# Patient Record
Sex: FEMALE | Race: WHITE | NOT HISPANIC OR LATINO
[De-identification: names, ages, dates, MRNs, and addresses within clinical notes are randomized per-mention and may not be internally consistent; named-entity substitution may affect disease eponyms.]

---

## 2018-01-10 ENCOUNTER — RESULT REVIEW (OUTPATIENT)
Age: 43
End: 2018-01-10

## 2018-08-02 ENCOUNTER — EMERGENCY (EMERGENCY)
Facility: HOSPITAL | Age: 43
LOS: 1 days | End: 2018-08-02
Attending: EMERGENCY MEDICINE
Payer: COMMERCIAL

## 2018-08-02 VITALS
HEART RATE: 96 BPM | TEMPERATURE: 99 F | RESPIRATION RATE: 14 BRPM | DIASTOLIC BLOOD PRESSURE: 76 MMHG | SYSTOLIC BLOOD PRESSURE: 106 MMHG | OXYGEN SATURATION: 98 %

## 2018-08-02 DIAGNOSIS — K11.21 ACUTE SIALOADENITIS: ICD-10-CM

## 2018-08-02 DIAGNOSIS — H60.90 UNSPECIFIED OTITIS EXTERNA, UNSPECIFIED EAR: ICD-10-CM

## 2018-08-02 LAB
ALBUMIN SERPL ELPH-MCNC: 3.7 G/DL — SIGNIFICANT CHANGE UP (ref 3.3–5)
ALP SERPL-CCNC: 91 U/L — SIGNIFICANT CHANGE UP (ref 40–120)
ALT FLD-CCNC: 20 U/L — SIGNIFICANT CHANGE UP (ref 10–45)
ANION GAP SERPL CALC-SCNC: 10 MMOL/L — SIGNIFICANT CHANGE UP (ref 5–17)
AST SERPL-CCNC: 12 U/L — SIGNIFICANT CHANGE UP (ref 10–40)
BASE EXCESS BLDV CALC-SCNC: 0.4 MMOL/L — SIGNIFICANT CHANGE UP (ref -2–2)
BASOPHILS # BLD AUTO: 0.1 K/UL — SIGNIFICANT CHANGE UP (ref 0–0.2)
BASOPHILS NFR BLD AUTO: 0.6 % — SIGNIFICANT CHANGE UP (ref 0–2)
BILIRUB SERPL-MCNC: 0.1 MG/DL — LOW (ref 0.2–1.2)
BUN SERPL-MCNC: 7 MG/DL — SIGNIFICANT CHANGE UP (ref 7–23)
CA-I SERPL-SCNC: 1.18 MMOL/L — SIGNIFICANT CHANGE UP (ref 1.12–1.3)
CALCIUM SERPL-MCNC: 9 MG/DL — SIGNIFICANT CHANGE UP (ref 8.4–10.5)
CHLORIDE BLDV-SCNC: 109 MMOL/L — HIGH (ref 96–108)
CHLORIDE SERPL-SCNC: 104 MMOL/L — SIGNIFICANT CHANGE UP (ref 96–108)
CO2 BLDV-SCNC: 30 MMOL/L — SIGNIFICANT CHANGE UP (ref 22–30)
CO2 SERPL-SCNC: 27 MMOL/L — SIGNIFICANT CHANGE UP (ref 22–31)
CREAT SERPL-MCNC: 0.87 MG/DL — SIGNIFICANT CHANGE UP (ref 0.5–1.3)
EOSINOPHIL # BLD AUTO: 0.3 K/UL — SIGNIFICANT CHANGE UP (ref 0–0.5)
EOSINOPHIL NFR BLD AUTO: 2.2 % — SIGNIFICANT CHANGE UP (ref 0–6)
GAS PNL BLDV: 137 MMOL/L — SIGNIFICANT CHANGE UP (ref 136–145)
GAS PNL BLDV: SIGNIFICANT CHANGE UP
GLUCOSE BLDV-MCNC: 70 MG/DL — SIGNIFICANT CHANGE UP (ref 70–99)
GLUCOSE SERPL-MCNC: 77 MG/DL — SIGNIFICANT CHANGE UP (ref 70–99)
HCG SERPL-ACNC: <2 MIU/ML — SIGNIFICANT CHANGE UP
HCO3 BLDV-SCNC: 28 MMOL/L — SIGNIFICANT CHANGE UP (ref 21–29)
HCT VFR BLD CALC: 37.9 % — SIGNIFICANT CHANGE UP (ref 34.5–45)
HCT VFR BLDA CALC: 39 % — SIGNIFICANT CHANGE UP (ref 39–50)
HGB BLD CALC-MCNC: 12.6 G/DL — SIGNIFICANT CHANGE UP (ref 11.5–15.5)
HGB BLD-MCNC: 12.7 G/DL — SIGNIFICANT CHANGE UP (ref 11.5–15.5)
LACTATE BLDV-MCNC: 1.5 MMOL/L — SIGNIFICANT CHANGE UP (ref 0.7–2)
LYMPHOCYTES # BLD AUTO: 29.2 % — SIGNIFICANT CHANGE UP (ref 13–44)
LYMPHOCYTES # BLD AUTO: 3.7 K/UL — HIGH (ref 1–3.3)
MCHC RBC-ENTMCNC: 29.9 PG — SIGNIFICANT CHANGE UP (ref 27–34)
MCHC RBC-ENTMCNC: 33.5 GM/DL — SIGNIFICANT CHANGE UP (ref 32–36)
MCV RBC AUTO: 89.4 FL — SIGNIFICANT CHANGE UP (ref 80–100)
MONOCYTES # BLD AUTO: 0.8 K/UL — SIGNIFICANT CHANGE UP (ref 0–0.9)
MONOCYTES NFR BLD AUTO: 6.4 % — SIGNIFICANT CHANGE UP (ref 2–14)
NEUTROPHILS # BLD AUTO: 7.9 K/UL — HIGH (ref 1.8–7.4)
NEUTROPHILS NFR BLD AUTO: 61.7 % — SIGNIFICANT CHANGE UP (ref 43–77)
PCO2 BLDV: 60 MMHG — HIGH (ref 35–50)
PH BLDV: 7.28 — LOW (ref 7.35–7.45)
PLATELET # BLD AUTO: 324 K/UL — SIGNIFICANT CHANGE UP (ref 150–400)
PO2 BLDV: 20 MMHG — LOW (ref 25–45)
POTASSIUM BLDV-SCNC: 4 MMOL/L — SIGNIFICANT CHANGE UP (ref 3.5–5.3)
POTASSIUM SERPL-MCNC: 4.3 MMOL/L — SIGNIFICANT CHANGE UP (ref 3.5–5.3)
POTASSIUM SERPL-SCNC: 4.3 MMOL/L — SIGNIFICANT CHANGE UP (ref 3.5–5.3)
PROT SERPL-MCNC: 7 G/DL — SIGNIFICANT CHANGE UP (ref 6–8.3)
RBC # BLD: 4.25 M/UL — SIGNIFICANT CHANGE UP (ref 3.8–5.2)
RBC # FLD: 13.5 % — SIGNIFICANT CHANGE UP (ref 10.3–14.5)
SAO2 % BLDV: 35 % — LOW (ref 67–88)
SODIUM SERPL-SCNC: 141 MMOL/L — SIGNIFICANT CHANGE UP (ref 135–145)
WBC # BLD: 12.8 K/UL — HIGH (ref 3.8–10.5)
WBC # FLD AUTO: 12.8 K/UL — HIGH (ref 3.8–10.5)

## 2018-08-02 PROCEDURE — 70491 CT SOFT TISSUE NECK W/DYE: CPT | Mod: 26

## 2018-08-02 PROCEDURE — 99220: CPT

## 2018-08-02 RX ORDER — HYDROMORPHONE HYDROCHLORIDE 2 MG/ML
0.5 INJECTION INTRAMUSCULAR; INTRAVENOUS; SUBCUTANEOUS ONCE
Qty: 0 | Refills: 0 | Status: DISCONTINUED | OUTPATIENT
Start: 2018-08-02 | End: 2018-08-02

## 2018-08-02 RX ORDER — SERTRALINE 25 MG/1
50 TABLET, FILM COATED ORAL AT BEDTIME
Qty: 0 | Refills: 0 | Status: DISCONTINUED | OUTPATIENT
Start: 2018-08-02 | End: 2018-08-06

## 2018-08-02 RX ORDER — SODIUM CHLORIDE 9 MG/ML
3 INJECTION INTRAMUSCULAR; INTRAVENOUS; SUBCUTANEOUS EVERY 8 HOURS
Qty: 0 | Refills: 0 | Status: DISCONTINUED | OUTPATIENT
Start: 2018-08-02 | End: 2018-08-06

## 2018-08-02 RX ORDER — CIPROFLOXACIN LACTATE 400MG/40ML
400 VIAL (ML) INTRAVENOUS EVERY 12 HOURS
Qty: 0 | Refills: 0 | Status: DISCONTINUED | OUTPATIENT
Start: 2018-08-02 | End: 2018-08-06

## 2018-08-02 RX ORDER — DULOXETINE HYDROCHLORIDE 30 MG/1
60 CAPSULE, DELAYED RELEASE ORAL AT BEDTIME
Qty: 0 | Refills: 0 | Status: DISCONTINUED | OUTPATIENT
Start: 2018-08-02 | End: 2018-08-06

## 2018-08-02 RX ORDER — ACETAMINOPHEN 500 MG
650 TABLET ORAL ONCE
Qty: 0 | Refills: 0 | Status: DISCONTINUED | OUTPATIENT
Start: 2018-08-02 | End: 2018-08-02

## 2018-08-02 RX ORDER — ACETAMINOPHEN 500 MG
1000 TABLET ORAL ONCE
Qty: 0 | Refills: 0 | Status: COMPLETED | OUTPATIENT
Start: 2018-08-02 | End: 2018-08-02

## 2018-08-02 RX ORDER — IBUPROFEN 200 MG
600 TABLET ORAL ONCE
Qty: 0 | Refills: 0 | Status: DISCONTINUED | OUTPATIENT
Start: 2018-08-02 | End: 2018-08-02

## 2018-08-02 RX ORDER — CIPROFLOXACIN AND DEXAMETHASONE 3; 1 MG/ML; MG/ML
2 SUSPENSION/ DROPS AURICULAR (OTIC) ONCE
Qty: 0 | Refills: 0 | Status: COMPLETED | OUTPATIENT
Start: 2018-08-02 | End: 2018-08-02

## 2018-08-02 RX ORDER — DEXAMETHASONE 0.5 MG/5ML
10 ELIXIR ORAL ONCE
Qty: 0 | Refills: 0 | Status: COMPLETED | OUTPATIENT
Start: 2018-08-02 | End: 2018-08-02

## 2018-08-02 RX ORDER — SODIUM CHLORIDE 9 MG/ML
1000 INJECTION INTRAMUSCULAR; INTRAVENOUS; SUBCUTANEOUS ONCE
Qty: 0 | Refills: 0 | Status: COMPLETED | OUTPATIENT
Start: 2018-08-02 | End: 2018-08-02

## 2018-08-02 RX ORDER — KETOROLAC TROMETHAMINE 30 MG/ML
30 SYRINGE (ML) INJECTION ONCE
Qty: 0 | Refills: 0 | Status: DISCONTINUED | OUTPATIENT
Start: 2018-08-02 | End: 2018-08-02

## 2018-08-02 RX ORDER — CIPROFLOXACIN LACTATE 400MG/40ML
400 VIAL (ML) INTRAVENOUS ONCE
Qty: 0 | Refills: 0 | Status: COMPLETED | OUTPATIENT
Start: 2018-08-02 | End: 2018-08-02

## 2018-08-02 RX ORDER — CLONAZEPAM 1 MG
1 TABLET ORAL AT BEDTIME
Qty: 0 | Refills: 0 | Status: COMPLETED | OUTPATIENT
Start: 2018-08-02 | End: 2018-08-09

## 2018-08-02 RX ORDER — FAMOTIDINE 10 MG/ML
20 INJECTION INTRAVENOUS AT BEDTIME
Qty: 0 | Refills: 0 | Status: DISCONTINUED | OUTPATIENT
Start: 2018-08-02 | End: 2018-08-06

## 2018-08-02 RX ORDER — DULOXETINE HYDROCHLORIDE 30 MG/1
40 CAPSULE, DELAYED RELEASE ORAL DAILY
Qty: 0 | Refills: 0 | Status: DISCONTINUED | OUTPATIENT
Start: 2018-08-02 | End: 2018-08-06

## 2018-08-02 RX ADMIN — HYDROMORPHONE HYDROCHLORIDE 0.5 MILLIGRAM(S): 2 INJECTION INTRAMUSCULAR; INTRAVENOUS; SUBCUTANEOUS at 22:55

## 2018-08-02 RX ADMIN — FAMOTIDINE 20 MILLIGRAM(S): 10 INJECTION INTRAVENOUS at 23:51

## 2018-08-02 RX ADMIN — HYDROMORPHONE HYDROCHLORIDE 0.5 MILLIGRAM(S): 2 INJECTION INTRAMUSCULAR; INTRAVENOUS; SUBCUTANEOUS at 22:02

## 2018-08-02 RX ADMIN — Medication 102 MILLIGRAM(S): at 22:55

## 2018-08-02 RX ADMIN — Medication 1000 MILLIGRAM(S): at 19:05

## 2018-08-02 RX ADMIN — SERTRALINE 50 MILLIGRAM(S): 25 TABLET, FILM COATED ORAL at 23:51

## 2018-08-02 RX ADMIN — Medication 30 MILLIGRAM(S): at 23:51

## 2018-08-02 RX ADMIN — SODIUM CHLORIDE 1000 MILLILITER(S): 9 INJECTION INTRAMUSCULAR; INTRAVENOUS; SUBCUTANEOUS at 19:55

## 2018-08-02 RX ADMIN — HYDROMORPHONE HYDROCHLORIDE 0.5 MILLIGRAM(S): 2 INJECTION INTRAMUSCULAR; INTRAVENOUS; SUBCUTANEOUS at 20:42

## 2018-08-02 RX ADMIN — SODIUM CHLORIDE 1000 MILLILITER(S): 9 INJECTION INTRAMUSCULAR; INTRAVENOUS; SUBCUTANEOUS at 18:47

## 2018-08-02 RX ADMIN — CIPROFLOXACIN AND DEXAMETHASONE 2 DROP(S): 3; 1 SUSPENSION/ DROPS AURICULAR (OTIC) at 22:01

## 2018-08-02 RX ADMIN — Medication 400 MILLIGRAM(S): at 18:47

## 2018-08-02 RX ADMIN — Medication 200 MILLIGRAM(S): at 22:24

## 2018-08-02 NOTE — CONSULT NOTE ADULT - PROBLEM SELECTOR RECOMMENDATION 9
Pt discussed in detail with Dr. Johnson, plan for HOB elevation  - DM control  - Antibiotics with gram coverage (augmentin 875mg bid x 10 days, or keflex 500mg po bid)  - Massage and warm compresses and sialogogues (anything sour- lemon chris) q 4  - hydration Pt discussed in detail with Dr. Johnson, plan for HOB elevation  - Antibiotics with gram coverage  - Massage and warm compresses and sialogogues (anything sour- lemon juice) q 4  - hydration

## 2018-08-02 NOTE — ED PROVIDER NOTE - ATTENDING CONTRIBUTION TO CARE
Attending MD Mosher:  I personally have seen and examined this patient.  Resident note reviewed and agree on plan of care and except where noted.  See MDM for details.

## 2018-08-02 NOTE — ED ADULT NURSE NOTE - OBJECTIVE STATEMENT
1705 42 yr old WF c/o severe right facial pain x 2 days associated with difficulty eating and drinking liquids. Was eval by PMD 2 days ago. Started on amoxil at that time.  Seen at another ER yesterday. Feels much worse today. Denies fever or chills. A&Ox4. Looks uncomfortable. No stridor, drooling or SOB. also c/o right ear pain. color pink. skin W&D. Lungs clear

## 2018-08-02 NOTE — CONSULT NOTE ADULT - ASSESSMENT
42y with right OE possible OM and parotitis 42y with right OE possible OM and parotitis. Wick placed in right ear, once swelling decreases wick will fall out on its own. or may need removal at follow up apt in one week.

## 2018-08-02 NOTE — ED ADULT NURSE NOTE - NSIMPLEMENTINTERV_GEN_ALL_ED
Implemented All Universal Safety Interventions:  [place] to call system. Call bell, personal items and telephone within reach. Instruct patient to call for assistance. Room bathroom lighting operational. Non-slip footwear when patient is off stretcher. Physically safe environment: no spills, clutter or unnecessary equipment. Stretcher in lowest position, wheels locked, appropriate side rails in place.

## 2018-08-02 NOTE — ED PROVIDER NOTE - MEDICAL DECISION MAKING DETAILS
43yo female hx of hashimoto's thyroiditis on synthroid, DM, psoriasis presenting with increased right cheek and neck pain and swelling along with chills, difficulty and pain with swallowing. Swelling and tender right cheek. Labs, vbg, CT neck soft tissue. 41yo female hx of hashimoto's thyroiditis on synthroid, DM, psoriasis presenting with increased right cheek and neck pain and swelling along with chills, difficulty and pain with swallowing. Swelling and tender right cheek. Labs, vbg, CT neck soft tissue.    Attending MD Mosher: 41 yo female DM, hashimotos, recent ear infection on amoxicillin.  Now with right sided facial and neck swelling, painful swallowing, right ear hearing decreased, taking percocet but ran out.  No fever.  On exam no stridor, right sided neck and lower cheek swelling, no dental caries or infection.  Plan:  CT soft tissue neck with iv contrast to rule out abscess vs lymph node.  Will check vaccination states for mumps risk, consider parotid gland stone. 41yo female hx of hashimoto's thyroiditis on synthroid, DM, psoriasis presenting with increased right cheek and neck pain and swelling along with chills, difficulty and pain with swallowing. Swelling and tender right cheek. Labs, vbg, CT neck soft tissue.    Attending MD Mosher: 41 yo female DM, hashimotos, recent ear infection on amoxicillin.  Now with right sided facial and neck swelling, painful swallowing, right ear hearing decreased, taking percocet but ran out.  No fever.  On exam no stridor, right sided neck and lower cheek swelling, no dental caries or infection, pain with movement of right pinna, EOC swelling and erythema.  Plan:  CT soft tissue neck with iv contrast to rule out abscess vs lymph node.  Will check vaccination states for mumps risk, consider parotid gland stone.  Seen by ENT in ED and recommends IV antibiotics and observation in the CDU.

## 2018-08-02 NOTE — ED PROVIDER NOTE - PHYSICAL EXAMINATION
Gen: No acute distress, alert, cooperative  Head: Normocephalic, Atraumatic  HEENT: PERRL, oral mucosa moist with no intraoral swelling or erythema. right sided neck swelling and diffuse tenderness from below mandible retirement down neck. Possibly enlarged lymph nodes in that area, but more diffuse swelling.   Lung: CTAB, no respiratory distress, no crackles or wheezes  CV: rrr, no murmur  Abd: soft, NTND, no rebound or guarding  MSK: No LE edema  Neuro: No focal neurologic deficits  Skin: Warm and dry, no evidence of rash   Psych: normal affect, follows commands

## 2018-08-02 NOTE — ED PROVIDER NOTE - PMH
Hashimoto's disease    Hypothyroid    Moderate episode of recurrent major depressive disorder    Pre-existing diabetes mellitus during pregnancy, unspecified trimester

## 2018-08-02 NOTE — ED PROVIDER NOTE - CARE PLAN
Principal Discharge DX:	Other infective acute otitis externa of right ear  Secondary Diagnosis:	Other acute nonsuppurative otitis media of right ear  Secondary Diagnosis:	Parotiditis

## 2018-08-02 NOTE — CONSULT NOTE ADULT - PROBLEM SELECTOR RECOMMENDATION 2
- Floxin drops 5 frops to affected ear BID   - ciprodex 3 drops bid - IV cipro   - IV decadron x 1 dose   - ciprodex 3 drops bid in right ear   - CDU for overnight for pain control   - will re-eval pt in am   - Pt is to follow up at [facility] ENT clinic in 1 weeks (important to confirm Wick fell out or required removal. Call [phone] to make appointment.

## 2018-08-02 NOTE — ED ADULT NURSE NOTE - CHIEF COMPLAINT QUOTE
pt was seen at [facility] o Tuesday dx lymph nodes swollen gave her 6 Percocet and Motrin and now has no lesia meds and states she could not get appointment with any ENT  right side facila swelling not getting better also has steroids

## 2018-08-02 NOTE — ED PROVIDER NOTE - OBJECTIVE STATEMENT
41yo female hx of hashimoto's thyroiditis on synthroid, DM, psoriasis presenting with increased right cheek and neck pain and swelling along with chills, difficulty and pain with swallowing. Was seen on Tuesday and diagnosed with ear infection. Started on amoxicillin, symptoms only becoming worse. Hasn't been able to see ENT. Hasn't taken anything for pain today, but was taking percs and ibuprofen. 41yo female hx of hashimoto's thyroiditis on synthroid, psoriasis presenting with increased right cheek and neck pain and swelling along with chills, difficulty and pain with swallowing. Was seen on Tuesday and diagnosed with ear infection. Started on amoxicillin, symptoms only becoming worse. Hasn't been able to see ENT. Hasn't taken anything for pain today, but was taking percs and ibuprofen.

## 2018-08-02 NOTE — ED ADULT NURSE REASSESSMENT NOTE - NS ED NURSE REASSESS COMMENT FT1
Pt received from NANCY Petersen. Pt oriented to CDU & plan of care was discussed. Pt complaining of above 10/10 pain to R side of face. Pt endorses ear pain radiating down R face and jaw. Pt endorses pain with swallowing. Medicated as per MAR. Safety & comfort measures maintained. Call bell in reach. Will continue to monitor.

## 2018-08-02 NOTE — CONSULT NOTE ADULT - SUBJECTIVE AND OBJECTIVE BOX
CC: right OE and parotitis    HPI:  Patient is a 42y old  Female who we are asked to evaluate the patient for right OE and parotitis. Pt with hx of hashimoto's thyroiditis on synthroid, psoriasis presenting with increased right cheek, ear and neck pain and swelling along with chills, pain with swallowing. Was seen on Tuesday and diagnosed with ear infection. Started on amoxicillin, symptoms only becoming worse. Hasn't taken anything for pain today, but was taking percs and ibuprofen. Pt has not seen ENT out yet. Pt denies any n/v, tinnitus, dizziness, congestion, recent URI, otorrhea, hearing loss, hx of sx or trauma or recent travel, drooling, trismus, uvula deviation, change in saliva color.        PAST MEDICAL & SURGICAL HISTORY:  Moderate episode of recurrent major depressive disorder  Pre-existing diabetes mellitus during pregnancy, unspecified trimester  Hashimoto's disease  Hypothyroid  No significant past surgical history    Allergies    levothyroxine (Headache)  morphine (Headache)    Intolerances      MEDICATIONS  (STANDING):  ciprofloxacin/dexamethasone Suspension Otic 2 Drop(s) Right Ear Once  HYDROmorphone  Injectable 0.5 milliGRAM(s) IV Push Once    MEDICATIONS  (PRN):      Social History: no tobacco, no etoh     Family history: Pt denies any sign FHx    ROS:   ENT: all negative except as noted in HPI   CV: denies palpitations  Pulm: denies SOB, cough, hemoptysis  GI: denies change in apetite, indigestion, n/v  : denies pertinent urinary symptoms, urgency  Neuro: denies numbness/tingling, loss of sensation  Psych: denies anxiety  MS: denies muscle weakness, instability  Heme: denies easy bruising or bleeding  Endo: denies heat/cold intolerance, excessive sweating  Vascular: denies LE edema    Vital Signs Last 24 Hrs  T(C): 36.7 (02 Aug 2018 17:45), Max: 37.1 (02 Aug 2018 16:30)  T(F): 98.1 (02 Aug 2018 17:45), Max: 98.7 (02 Aug 2018 16:30)  HR: 84 (02 Aug 2018 17:45) (84 - 96)  BP: 114/75 (02 Aug 2018 17:45) (106/76 - 114/75)  BP(mean): --  RR: 20 (02 Aug 2018 17:45) (14 - 20)  SpO2: 98% (02 Aug 2018 16:30) (98% - 98%)                          12.7   12.8  )-----------( 324      ( 02 Aug 2018 18:49 )             37.9    08-02    141  |  104  |  7   ----------------------------<  77  4.3   |  27  |  0.87    Ca    9.0      02 Aug 2018 18:49    TPro  7.0  /  Alb  3.7  /  TBili  0.1<L>  /  DBili  x   /  AST  12  /  ALT  20  /  AlkPhos  91  08-02       PHYSICAL EXAM:  Gen: NAD  Skin: No rashes, bruises, or lesions  Head: Normocephalic, Atraumatic  Face: Left Parotid gland soft without mass. Right with minimal induration and edema, no fluctuance, warmths, stone or mass on palpation  Eyes: no scleral injection  Ears: Right - ear canal clear, TM intact without effusion or erythema. No evidence of any fluid drainage. No mastoid tenderness, erythema, or ear bulging            Left - ear canal clear, TM intact without effusion or erythema. No evidence of any fluid drainage. No mastoid tenderness, erythema, or ear bulging  Nose: Nares bilaterally patent, no discharge  Mouth: No Stridor / Drooling / Trismus.  Mucosa moist, tongue/uvula midline, oropharynx clear, No pus from Stensens duct for parotitis and Whartons duct for submandibular gland.  Neck: edema and erythema on right side, supple, no lymphadenopathy, trachea midline, no masses  Lymphatic: No lymphadenopathy  Resp: breathing easily, no stridor  CV: no peripheral edema/cyanosis  GI: nondistended   Peripheral vascular: no JVD or edema  Neuro: facial nerve intact, no facial droop        Diagnostic Nasal Endoscopy: (Scope #2 used)    Fiberoptic Indirect laryngoscopy:  (Scope #2 used)        IMAGING/ADDITIONAL STUDIES:  < from: CT Neck Soft Tissue w/ IV Cont (08.02.18 @ 19:40) >  IMPRESSION:  Right otitis externa and likely otitis media with mild adjacent right   parotitis and neck swelling.    < end of copied text > CC: right OE and parotitis    HPI:  Patient is a 42y old  Female who we are asked to evaluate for right OE and parotitis. Pt with hx of hashimoto's thyroiditis on synthroid, psoriasis presenting with increased right cheek, ear and neck pain and swelling along with chills, pain with swallowing. Was seen on Tuesday and diagnosed with ear infection. Started on amoxicillin, symptoms only becoming worse. Hasn't taken anything for pain today, but was taking percs and ibuprofen. Pt has not seen ENT out yet. Pt denies any n/v, tinnitus, dizziness, congestion, recent URI, otorrhea, hearing loss, hx of sx or trauma or recent travel, drooling, trismus, uvula deviation, change in saliva color.        PAST MEDICAL & SURGICAL HISTORY:  Moderate episode of recurrent major depressive disorder  Pre-existing diabetes mellitus during pregnancy, unspecified trimester  Hashimoto's disease  Hypothyroid  No significant past surgical history    Allergies    levothyroxine (Headache)  morphine (Headache)    Intolerances      MEDICATIONS  (STANDING):  ciprofloxacin/dexamethasone Suspension Otic 2 Drop(s) Right Ear Once  HYDROmorphone  Injectable 0.5 milliGRAM(s) IV Push Once    MEDICATIONS  (PRN):      Social History: no tobacco, no etoh     Family history: Pt denies any sign FHx    ROS:   ENT: all negative except as noted in HPI   CV: denies palpitations  Pulm: denies SOB, cough, hemoptysis  GI: denies change in apetite, indigestion, n/v  : denies pertinent urinary symptoms, urgency  Neuro: denies numbness/tingling, loss of sensation  Psych: denies anxiety  MS: denies muscle weakness, instability  Heme: denies easy bruising or bleeding  Endo: denies heat/cold intolerance, excessive sweating  Vascular: denies LE edema    Vital Signs Last 24 Hrs  T(C): 36.7 (02 Aug 2018 17:45), Max: 37.1 (02 Aug 2018 16:30)  T(F): 98.1 (02 Aug 2018 17:45), Max: 98.7 (02 Aug 2018 16:30)  HR: 84 (02 Aug 2018 17:45) (84 - 96)  BP: 114/75 (02 Aug 2018 17:45) (106/76 - 114/75)  BP(mean): --  RR: 20 (02 Aug 2018 17:45) (14 - 20)  SpO2: 98% (02 Aug 2018 16:30) (98% - 98%)                          12.7   12.8  )-----------( 324      ( 02 Aug 2018 18:49 )             37.9    08-02    141  |  104  |  7   ----------------------------<  77  4.3   |  27  |  0.87    Ca    9.0      02 Aug 2018 18:49    TPro  7.0  /  Alb  3.7  /  TBili  0.1<L>  /  DBili  x   /  AST  12  /  ALT  20  /  AlkPhos  91  08-02       PHYSICAL EXAM:  Gen: NAD  Skin: No rashes, bruises, or lesions  Head: Normocephalic, Atraumatic  Face: Left Parotid gland soft without mass. Right with minimal induration and edema, no fluctuance, warmths, stone on palpation  Eyes: no scleral injection  Ears: Right - mild edema of auricle, TTP on pina, tragus, ear canal swollen, unable to visualize TM, +clear fluid noted, mild erythema. wick placed. No mastoid tenderness, erythema,             Left - ear canal clear, TM intact without effusion or erythema. No evidence of any fluid drainage. No mastoid tenderness, erythema, or ear bulging  Nose: Nares bilaterally patent, no discharge  Mouth: No Stridor / Drooling / Trismus.  Mucosa moist, tongue/uvula midline, oropharynx clear, No pus from Stensens duct for parotitis and Whartons duct for submandibular gland.  Neck: edema on right side, supple, no lymphadenopathy, trachea midline, no masses  Lymphatic: No lymphadenopathy  Resp: breathing easily, no stridor  CV: no peripheral edema/cyanosis  GI: nondistended   Peripheral vascular: no JVD or edema  Neuro: facial nerve intact, no facial droop            Fiberoptic Indirect laryngoscopy:  (Scope #2 used)        IMAGING/ADDITIONAL STUDIES:  < from: CT Neck Soft Tissue w/ IV Cont (08.02.18 @ 19:40) >  IMPRESSION:  Right otitis externa and likely otitis media with mild adjacent right   parotitis and neck swelling.    < end of copied text >

## 2018-08-02 NOTE — ED ADULT TRIAGE NOTE - CHIEF COMPLAINT QUOTE
pt was seen at [facility] o Tuesday dx lymph nodes swollen gave her 6 Percocet and Motrin and now has no lesia meds and states she could not get appointment with any ENT  right side facila swelling not getting better also has steroids

## 2018-08-03 VITALS
SYSTOLIC BLOOD PRESSURE: 120 MMHG | DIASTOLIC BLOOD PRESSURE: 84 MMHG | HEART RATE: 80 BPM | TEMPERATURE: 98 F | RESPIRATION RATE: 16 BRPM | OXYGEN SATURATION: 96 %

## 2018-08-03 DIAGNOSIS — K11.20 SIALOADENITIS, UNSPECIFIED: ICD-10-CM

## 2018-08-03 PROCEDURE — 80053 COMPREHEN METABOLIC PANEL: CPT

## 2018-08-03 PROCEDURE — 96375 TX/PRO/DX INJ NEW DRUG ADDON: CPT

## 2018-08-03 PROCEDURE — 96376 TX/PRO/DX INJ SAME DRUG ADON: CPT | Mod: XU

## 2018-08-03 PROCEDURE — 82803 BLOOD GASES ANY COMBINATION: CPT

## 2018-08-03 PROCEDURE — 99284 EMERGENCY DEPT VISIT MOD MDM: CPT | Mod: 25

## 2018-08-03 PROCEDURE — 96361 HYDRATE IV INFUSION ADD-ON: CPT

## 2018-08-03 PROCEDURE — 82330 ASSAY OF CALCIUM: CPT

## 2018-08-03 PROCEDURE — 84702 CHORIONIC GONADOTROPIN TEST: CPT

## 2018-08-03 PROCEDURE — G0378: CPT

## 2018-08-03 PROCEDURE — 85027 COMPLETE CBC AUTOMATED: CPT

## 2018-08-03 PROCEDURE — 99217: CPT

## 2018-08-03 PROCEDURE — 82565 ASSAY OF CREATININE: CPT

## 2018-08-03 PROCEDURE — 82947 ASSAY GLUCOSE BLOOD QUANT: CPT

## 2018-08-03 PROCEDURE — 85014 HEMATOCRIT: CPT

## 2018-08-03 PROCEDURE — 84295 ASSAY OF SERUM SODIUM: CPT

## 2018-08-03 PROCEDURE — 84132 ASSAY OF SERUM POTASSIUM: CPT

## 2018-08-03 PROCEDURE — 70491 CT SOFT TISSUE NECK W/DYE: CPT

## 2018-08-03 PROCEDURE — 83605 ASSAY OF LACTIC ACID: CPT

## 2018-08-03 PROCEDURE — 82435 ASSAY OF BLOOD CHLORIDE: CPT

## 2018-08-03 PROCEDURE — 96365 THER/PROPH/DIAG IV INF INIT: CPT | Mod: XU

## 2018-08-03 RX ORDER — CIPROFLOXACIN AND DEXAMETHASONE 3; 1 MG/ML; MG/ML
4 SUSPENSION/ DROPS AURICULAR (OTIC)
Qty: 1 | Refills: 0 | OUTPATIENT
Start: 2018-08-03 | End: 2018-08-09

## 2018-08-03 RX ORDER — DOXEPIN HCL 100 MG
3 CAPSULE ORAL
Qty: 0 | Refills: 0 | COMMUNITY

## 2018-08-03 RX ORDER — CLONAZEPAM 1 MG
1 TABLET ORAL
Qty: 0 | Refills: 0 | COMMUNITY

## 2018-08-03 RX ORDER — DULOXETINE HYDROCHLORIDE 30 MG/1
1 CAPSULE, DELAYED RELEASE ORAL
Qty: 0 | Refills: 0 | COMMUNITY

## 2018-08-03 RX ORDER — SERTRALINE 25 MG/1
1 TABLET, FILM COATED ORAL
Qty: 0 | Refills: 0 | COMMUNITY

## 2018-08-03 RX ORDER — ACETAMINOPHEN 500 MG
1000 TABLET ORAL ONCE
Qty: 0 | Refills: 0 | Status: COMPLETED | OUTPATIENT
Start: 2018-08-03 | End: 2018-08-03

## 2018-08-03 RX ORDER — CIPROFLOXACIN AND DEXAMETHASONE 3; 1 MG/ML; MG/ML
3 SUSPENSION/ DROPS AURICULAR (OTIC)
Qty: 0 | Refills: 0 | Status: DISCONTINUED | OUTPATIENT
Start: 2018-08-03 | End: 2018-08-06

## 2018-08-03 RX ORDER — MOXIFLOXACIN HYDROCHLORIDE TABLETS, 400 MG 400 MG/1
1 TABLET, FILM COATED ORAL
Qty: 20 | Refills: 0 | OUTPATIENT
Start: 2018-08-03 | End: 2018-08-12

## 2018-08-03 RX ORDER — IBUPROFEN 200 MG
600 TABLET ORAL ONCE
Qty: 0 | Refills: 0 | Status: COMPLETED | OUTPATIENT
Start: 2018-08-03 | End: 2018-08-03

## 2018-08-03 RX ORDER — DULOXETINE HYDROCHLORIDE 30 MG/1
2 CAPSULE, DELAYED RELEASE ORAL
Qty: 0 | Refills: 0 | COMMUNITY

## 2018-08-03 RX ORDER — FAMOTIDINE 10 MG/ML
1 INJECTION INTRAVENOUS
Qty: 0 | Refills: 0 | COMMUNITY

## 2018-08-03 RX ADMIN — SODIUM CHLORIDE 3 MILLILITER(S): 9 INJECTION INTRAMUSCULAR; INTRAVENOUS; SUBCUTANEOUS at 06:35

## 2018-08-03 RX ADMIN — Medication 30 MILLIGRAM(S): at 00:12

## 2018-08-03 RX ADMIN — Medication 1000 MILLIGRAM(S): at 04:32

## 2018-08-03 RX ADMIN — DULOXETINE HYDROCHLORIDE 60 MILLIGRAM(S): 30 CAPSULE, DELAYED RELEASE ORAL at 00:11

## 2018-08-03 RX ADMIN — Medication 600 MILLIGRAM(S): at 08:57

## 2018-08-03 RX ADMIN — Medication 1 MILLIGRAM(S): at 00:11

## 2018-08-03 RX ADMIN — Medication 400 MILLIGRAM(S): at 03:55

## 2018-08-03 NOTE — ED CDU PROVIDER INITIAL DAY NOTE - ATTENDING CONTRIBUTION TO CARE
Attending MD Mosher:   I personally have seen and examined this patient.  Physician assistant note reviewed and agree on plan of care and except where noted.  See MDM for details.

## 2018-08-03 NOTE — PROGRESS NOTE ADULT - ASSESSMENT
41yo female with right OE and parotitis. Pain and swelling improved overnight. Pt ok to be discharged from ENT standpoint.

## 2018-08-03 NOTE — PROGRESS NOTE ADULT - SUBJECTIVE AND OBJECTIVE BOX
ENT ISSUE/POD: right OE and parotitis    HPI: 43yo female with right OE with wick in place and right parotitis. Pt seen and examined at bedside. No acute events overnight. Pt states improvement of pain and swelling overnight. Pt denies fever, chills, n/v, HA, SOB, dysphagia, odynophagia, ear discharge, mastoid pain.         PAST MEDICAL & SURGICAL HISTORY:  Psoriatic arthritis  Pustular psoriasis  Fibromyalgia  Depression  Anxiety  Moderate episode of recurrent major depressive disorder  Pre-existing diabetes mellitus during pregnancy, unspecified trimester  Hashimoto's disease  Hypothyroid  No significant past surgical history    Allergies    levothyroxine (Headache)  morphine (Headache)    Intolerances      MEDICATIONS  (STANDING):  ciprofloxacin   IVPB 400 milliGRAM(s) IV Intermittent every 12 hours  ciprofloxacin/dexamethasone Suspension Otic 3 Drop(s) Both Ears two times a day  clonazePAM Tablet 1 milliGRAM(s) Oral at bedtime  DULoxetine 40 milliGRAM(s) Oral daily  DULoxetine 60 milliGRAM(s) Oral at bedtime  famotidine    Tablet 20 milliGRAM(s) Oral at bedtime  sertraline 50 milliGRAM(s) Oral at bedtime  sodium chloride 0.9% lock flush 3 milliLiter(s) IV Push every 8 hours    MEDICATIONS  (PRN):      Social History: see consult note    Family history: see consult note    ROS:   ENT: all negative except as noted in HPI   Pulm: denies SOB, cough, hemoptysis  Neuro: denies numbness/tingling, loss of sensation  Endo: denies heat/cold intolerance, excessive sweating      Vital Signs Last 24 Hrs  T(C): 37 (03 Aug 2018 07:58), Max: 37.1 (02 Aug 2018 16:30)  T(F): 98.6 (03 Aug 2018 07:58), Max: 98.7 (02 Aug 2018 16:30)  HR: 71 (03 Aug 2018 07:58) (70 - 96)  BP: 94/70 (03 Aug 2018 07:58) (94/70 - 125/86)  BP(mean): --  RR: 16 (03 Aug 2018 07:58) (14 - 20)  SpO2: 96% (03 Aug 2018 07:58) (95% - 98%)                          12.7   12.8  )-----------( 324      ( 02 Aug 2018 18:49 )             37.9    08-02    141  |  104  |  7   ----------------------------<  77  4.3   |  27  |  0.87    Ca    9.0      02 Aug 2018 18:49    TPro  7.0  /  Alb  3.7  /  TBili  0.1<L>  /  DBili  x   /  AST  12  /  ALT  20  /  AlkPhos  91  08-02       PHYSICAL EXAM:  Gen: NAD  Skin: No rashes, bruises, or lesions  Head: Normocephalic, Atraumatic  Face: no edema, erythema, or fluctuance.  Eyes: no scleral injection  Ears: Right - + erythema and edema of auricle, pinna, and tragus, wick in place, unable to visualize TM, No evidence of any fluid drainage. No mastoid tenderness, erythema, or ear bulging            Left - ear canal clear, TM intact without effusion or erythema. No evidence of any fluid drainage. No mastoid tenderness, erythema, or ear bulging  Nose: Nares bilaterally patent, no discharge  Mouth: no edema noted, mild TTP of right parotid area, no pus expressed, No Stridor / Drooling / Trismus.  Mucosa moist, tongue/uvula midline, oropharynx clear  Neck: Flat, supple, no lymphadenopathy, trachea midline, no masses  Lymphatic: No lymphadenopathy  Resp: breathing easily, no stridor  Neuro: facial nerve intact, no facial droop

## 2018-08-03 NOTE — ED CDU PROVIDER INITIAL DAY NOTE - OBJECTIVE STATEMENT
43y/o female with PMH of hashimoto's thyroiditis, hypothyroid, psoriasis, psoriatic arthritis, fibromyalgia, depression/anxiety, presenting with increased right ear, cheek, neck pain x 2 days. symptoms associated with neck swelling, chills, sweats, sore throat, dysphagia, decreased appetite, watery nonbloody diarrhea (last went this AM). pt went to [facility] ED on Tuesday and was diagnosed with otits and partotis. pt was started on amoxicillin and d/c'd. symptoms became worse so she came to the ED. pt hasn't been able to see ENT yet. Hasn't taken anything for pain today, but was taking percocoet and ibuprofen. Denies fever, cough, SOB, CP, back pain, abd pain, N/V, L ear pain.   In ED, WBC 12.8 otherwise labs unremarkable, CTneck shows R otitis externa/media and R parotitis, ENT consulted, wick placed in R ear, and pt sent to CDU for continued antibiotics and pain management.     PMD not aff.

## 2018-08-03 NOTE — ED CDU PROVIDER INITIAL DAY NOTE - MEDICAL DECISION MAKING DETAILS
Attending MD Mosher: 41 yo female DM, hashimotos, recent ear infection on amoxicillin.  Now with right sided facial and neck swelling, painful swallowing, right ear hearing decreased, taking percocet but ran out.  No fever.  On exam no stridor, right sided neck and lower cheek swelling, no dental caries or infection, pain with movement of right pinna, EOC swelling and erythema.  Plan:  CT soft tissue neck with iv contrast to rule out abscess vs lymph node.  Will check vaccination states for mumps risk, consider parotid gland stone.  Seen by ENT in ED and recommends IV antibiotics and observation in the CDU.

## 2018-08-03 NOTE — ED CDU PROVIDER SUBSEQUENT DAY NOTE - HISTORY
No interval changes since initial CDU provider note. Pain improved after toradol; pt sleeping comfortably now. NAD VSS. will continue IV cipro, ciprodex, pain management, and monitoring. ENT to re-evaluate pt in AM. - BRENDAN Wen

## 2018-08-03 NOTE — ED CDU PROVIDER SUBSEQUENT DAY NOTE - ATTENDING CONTRIBUTION TO CARE
ATTENDING, Rasheed W[place]: I have personally performed a face to face diagnostic evaluation on this patient.  I have reviewed the ACP note and agree with the history, exam, and plan of care, except as noted here. Progress notes and further evaluation to be reviewed.  renetta po. non-tender abdominal exam. d/w her cont abx/ gi f/u. reviewed results w pt, advised return precautions. ATTENDING, Rasheed W[place]: I have personally performed a face to face diagnostic evaluation on this patient.  I have reviewed the ACP note and agree with the history, exam, and plan of care, except as noted here. Progress notes and further evaluation to be reviewed.  feeling better, cleared by ent for home abx. R facial swelling minor. will dc on oral and gtt cipro

## 2018-08-03 NOTE — PROGRESS NOTE ADULT - PROBLEM SELECTOR PLAN 2
- continue ciprodex ear drops  - cipro PO 500mg bid x 10 days  - wick will fall out when swelling subsides  - f/u outpatient with Dr. Johnson or [facility]

## 2018-08-03 NOTE — ED CDU PROVIDER INITIAL DAY NOTE - DETAILS
-warm compresses/massage/sialologogues q4h  -Ciprodex  -IV ABX  -PAIN/FEVER CONTROL  -FREQ EVAL  -ENT following  -CASE D/W ATTENDING Dr. oMsher

## 2018-08-03 NOTE — ED CDU PROVIDER SUBSEQUENT DAY NOTE - PROGRESS NOTE DETAILS
CDU NOTE BRENDAN DEVINE: Pt resting comfortably, but c/o 7/10 pain still R face/neck/ear, would like more pain medication. NAD, VSS. will give IV tylenol, continue antibiotics, and warm compresses. will continue monitoring. renetta po. non-tender abdominal exam. d/w her cont abx/ gi f/u. reviewed results w pt, advised return precautions. I am prescribing a fluroquinolone for the patient. I have discussed the risks associated with this medication including tendon rupture and neuropathy. In choosing this antibiotic, I considered other classes and find that this is the most appropriate. The patient understands the risk and will limit strenuous exercise. CDU NOTE BRENDAN Huerta: pt resting comfortably, feels improved. NAD most recent VSS. +swelling noted at R parotid area. wick in R ear.   pt reevaluated by ENT- ok to d/c home on ABX. feeling better, cleared by ent for home abx. R facial swelling minor. will dc on oral and gtt cipro

## 2018-08-03 NOTE — ED CDU PROVIDER DISPOSITION NOTE - CLINICAL COURSE
43y/o female with PMH of hashimoto's thyroiditis, hypothyroid, psoriasis, psoriatic arthritis, fibromyalgia, depression/anxiety, presenting with increased right ear, cheek, neck pain x 2 days. symptoms associated with neck swelling, chills, sweats, sore throat, dysphagia, decreased appetite, watery nonbloody diarrhea (last went this AM). pt went to [facility] ED on Tuesday and was diagnosed with otits and partotis. pt was started on amoxicillin and d/c'd. symptoms became worse so she came to the ED. pt hasn't been able to see ENT yet. Hasn't taken anything for pain today, but was taking percocoet and ibuprofen. Denies fever, cough, SOB, CP, back pain, abd pain, N/V, L ear pain.   In ED, WBC 12.8 otherwise labs unremarkable, CTneck shows R otitis externa/media and R parotitis, ENT consulted, wick placed in R ear, and pt sent to CDU for continued antibiotics and pain management. 43y/o female with PMH of hashimoto's thyroiditis, hypothyroid, psoriasis, psoriatic arthritis, fibromyalgia, depression/anxiety, presenting with increased right ear, cheek, neck pain x 2 days. symptoms associated with neck swelling, chills, sweats, sore throat, dysphagia, decreased appetite, watery nonbloody diarrhea (last went this AM). pt went to [facility] ED on Tuesday and was diagnosed with otits and partotis. pt was started on amoxicillin and d/c'd. symptoms became worse so she came to the ED. pt hasn't been able to see ENT yet. Hasn't taken anything for pain today, but was taking percocoet and ibuprofen. Denies fever, cough, SOB, CP, back pain, abd pain, N/V, L ear pain.   In ED, WBC 12.8 otherwise labs unremarkable, CTneck shows R otitis externa/media and R parotitis, ENT consulted, wick placed in R ear, and pt sent to CDU for continued antibiotics and pain management. pt did well- pain controlled, ENT reevaluated- pt ok to be d/c home with outpt f/up and abx.

## 2018-08-03 NOTE — ED CDU PROVIDER INITIAL DAY NOTE - RIGHT EAR
outer ear red, wick place in canal cannot visual, previously examined by ED and ENT./AURICULAR/TRAGAL TENDERNESS

## 2018-08-03 NOTE — ED CDU PROVIDER INITIAL DAY NOTE - PMH
Anxiety    Depression    Fibromyalgia    Hashimoto's disease    Hypothyroid    Moderate episode of recurrent major depressive disorder    Pre-existing diabetes mellitus during pregnancy, unspecified trimester    Psoriatic arthritis    Pustular psoriasis

## 2018-08-03 NOTE — ED CDU PROVIDER DISPOSITION NOTE - PLAN OF CARE
1.  Drink plenty of fluids to stay hydrated.  2. Continue your home medications as directed ALONG WITH Cipro 500mg 2x a days as directed and Ciprodex ear drops; apply 3 drops to affected ear(s) 2x a day.  3. Use Tylenol (extra strength over-the-counter) or Motrin (600mg which is three 200mg over-the-counter tablets at once every 6hrs) for fever/pain.  4. Massage and apply warm compresses to R neck/cheek for 20min at a time several times/day for next few days. Be careful not to burn your skin. Suck on sour lozenges/candy/food every 4 hours.  5. You will need to follow-up with your PMD in 2-3 days. Follow up with ENT in 1 week, call [phone] for an appointment. A copy of your results were given with you to bring to your appt.  6. Return to ER for uncontrolled fever, severe pain, trouble keeping down fluids, or any other concerns. 1.  Drink plenty of fluids to stay hydrated.  2. Continue your home medications as directed ALONG WITH Cipro 500mg 2x a day for 10 days. and Ciprodex ear drops; apply 3 drops to affected ear(s) 2x a day.  3. Use Tylenol (extra strength over-the-counter) or Motrin (600mg which is three 200mg over-the-counter tablets at once every 6hrs) for fever/pain.  4. Massage and apply warm compresses to R neck/cheek for 20min at a time several times/day for next few days. Be careful not to burn your skin. Suck on sour lozenges/candy/food every 4 hours.  5. You will need to follow-up with your PMD in 2-3 days. Follow up with ENT in 1 week, call [phone] for an appointment. A copy of your results were given with you to bring to your appt.  6. Return to ER for uncontrolled fever, severe pain, trouble keeping down fluids, or any other concerns.

## 2018-09-11 NOTE — ED CDU PROVIDER INITIAL DAY NOTE - CONSTITUTIONAL [-], MLM
no fever Partial Purse String (Intermediate) Text: Given the location of the defect and the characteristics of the surrounding skin an intermediate purse string closure was deemed most appropriate.  Undermining was performed circumfirentially around the surgical defect.  A purse string suture was then placed and tightened. Wound tension only allowed a partial closure of the circular defect.

## 2019-04-01 NOTE — ED ADULT NURSE NOTE - NSFALLRSKHARMRISK_ED_ALL_ED
[Excellent] : ~his/her~  mood as  excellent [0] : 2) Feeling down, depressed, or hopeless: Not at all (0) [HIV test declined] : HIV test declined [Hepatitis C test declined] : Hepatitis C test declined [With Significant Other] : lives with significant other [High School] : high school [] :  [Sexually Active] : sexually active [Feels Safe at Home] : Feels safe at home [Fully functional (bathing, dressing, toileting, transferring, walking, feeding)] : Fully functional (bathing, dressing, toileting, transferring, walking, feeding) [Fully functional (using the telephone, shopping, preparing meals, housekeeping, doing laundry, using] : Fully functional and needs no help or supervision to perform IADLs (using the telephone, shopping, preparing meals, housekeeping, doing laundry, using transportation, managing medications and managing finances) [Smoke Detector] : smoke detector [Safety elements used in home] : safety elements used in home [Seat Belt] :  uses seat belt [] : No [Change in mental status noted] : No change in mental status noted [Language] : denies difficulty with language [Behavior] : denies difficulty with behavior [Learning/Retaining New Information] : denies difficulty learning/retaining new information [Handling Complex Tasks] : denies difficulty handling complex tasks [Reasoning] : denies difficulty with reasoning [Spatial Ability and Orientation] : denies difficulty with spatial ability and orientation [High Risk Behavior] : no high risk behavior [Reports changes in hearing] : Reports no changes in hearing [Reports changes in vision] : Reports no changes in vision [Reports changes in dental health] : Reports no changes in dental health [Carbon Monoxide Detector] : no carbon monoxide detector [Guns at Home] : no guns at home [Sunscreen] : does not use sunscreen [TB Exposure] : is not being exposed to tuberculosis no

## 2019-06-26 ENCOUNTER — RECORD ABSTRACTING (OUTPATIENT)
Age: 44
End: 2019-06-26

## 2019-06-26 DIAGNOSIS — R22.30 LOCALIZED SWELLING, MASS AND LUMP, UNSPECIFIED UPPER LIMB: ICD-10-CM

## 2019-06-26 DIAGNOSIS — Z86.39 PERSONAL HISTORY OF OTHER ENDOCRINE, NUTRITIONAL AND METABOLIC DISEASE: ICD-10-CM

## 2019-06-26 DIAGNOSIS — N92.0 EXCESSIVE AND FREQUENT MENSTRUATION WITH REGULAR CYCLE: ICD-10-CM

## 2019-06-26 DIAGNOSIS — N96 RECURRENT PREGNANCY LOSS: ICD-10-CM

## 2019-06-26 DIAGNOSIS — R10.2 PELVIC AND PERINEAL PAIN: ICD-10-CM

## 2019-06-26 DIAGNOSIS — Z92.89 PERSONAL HISTORY OF OTHER MEDICAL TREATMENT: ICD-10-CM

## 2019-06-26 DIAGNOSIS — R87.610 ATYPICAL SQUAMOUS CELLS OF UNDETERMINED SIGNIFICANCE ON CYTOLOGIC SMEAR OF CERVIX (ASC-US): ICD-10-CM

## 2019-06-26 DIAGNOSIS — Z86.19 PERSONAL HISTORY OF OTHER INFECTIOUS AND PARASITIC DISEASES: ICD-10-CM

## 2019-06-26 PROBLEM — L40.1 GENERALIZED PUSTULAR PSORIASIS: Chronic | Status: ACTIVE | Noted: 2018-08-03

## 2019-06-26 PROBLEM — F41.9 ANXIETY DISORDER, UNSPECIFIED: Chronic | Status: ACTIVE | Noted: 2018-08-03

## 2019-06-26 PROBLEM — L40.50 ARTHROPATHIC PSORIASIS, UNSPECIFIED: Chronic | Status: ACTIVE | Noted: 2018-08-03

## 2019-06-26 PROBLEM — M79.7 FIBROMYALGIA: Chronic | Status: ACTIVE | Noted: 2018-08-03

## 2019-06-26 PROBLEM — F32.9 MAJOR DEPRESSIVE DISORDER, SINGLE EPISODE, UNSPECIFIED: Chronic | Status: ACTIVE | Noted: 2018-08-03

## 2019-06-26 LAB — CYTOLOGY CVX/VAG DOC THIN PREP: NORMAL

## 2019-06-26 RX ORDER — CLONAZEPAM 0.5 MG/1
0.5 TABLET ORAL
Refills: 0 | Status: ACTIVE | COMMUNITY

## 2019-06-27 ENCOUNTER — APPOINTMENT (OUTPATIENT)
Dept: OBGYN | Facility: CLINIC | Age: 44
End: 2019-06-27
Payer: COMMERCIAL

## 2019-06-27 DIAGNOSIS — N94.6 DYSMENORRHEA, UNSPECIFIED: ICD-10-CM

## 2019-06-27 DIAGNOSIS — F17.200 NICOTINE DEPENDENCE, UNSPECIFIED, UNCOMPLICATED: ICD-10-CM

## 2019-06-27 LAB
BILIRUB UR QL STRIP: NORMAL
GLUCOSE UR-MCNC: NORMAL
HCG UR QL: 0.2 EU/DL
HCG UR QL: NEGATIVE
HGB UR QL STRIP.AUTO: NORMAL
KETONES UR-MCNC: ABNORMAL
LEUKOCYTE ESTERASE UR QL STRIP: NORMAL
NITRITE UR QL STRIP: NORMAL
PH UR STRIP: 6
PROT UR STRIP-MCNC: NORMAL
QUALITY CONTROL: YES
SP GR UR STRIP: 1.01

## 2019-06-27 PROCEDURE — 81003 URINALYSIS AUTO W/O SCOPE: CPT | Mod: QW

## 2019-06-27 PROCEDURE — 81025 URINE PREGNANCY TEST: CPT

## 2019-06-27 PROCEDURE — 99214 OFFICE O/P EST MOD 30 MIN: CPT

## 2019-06-27 PROCEDURE — 36415 COLL VENOUS BLD VENIPUNCTURE: CPT

## 2019-06-27 RX ORDER — OXYCODONE 10 MG/1
10 TABLET ORAL
Refills: 0 | Status: DISCONTINUED | COMMUNITY
End: 2019-06-27

## 2019-06-27 RX ORDER — DOXEPIN HYDROCHLORIDE 10 MG/1
10 CAPSULE ORAL
Refills: 0 | Status: DISCONTINUED | COMMUNITY
End: 2019-06-27

## 2019-06-27 RX ORDER — LEVOTHYROXINE SODIUM 0.17 MG/1
175 TABLET ORAL
Refills: 0 | Status: DISCONTINUED | COMMUNITY
End: 2019-06-27

## 2019-06-27 RX ORDER — CHROMIUM 200 MCG
TABLET ORAL
Refills: 0 | Status: DISCONTINUED | COMMUNITY
End: 2019-06-27

## 2019-06-27 NOTE — REVIEW OF SYSTEMS
[Sight Problems] : sight problems [Chills] : chills [Recent Wt Gain ___ Lbs] : recent [unfilled] ~Ulb weight gain [Abdominal Pain] : abdominal pain [Palpitations] : palpitations [Vomiting] : vomiting [Diarrhea] : diarrhea [Abdominal Swelling] : abdominal swelling [Nipple Discharge] : nipple discharge [Headache] : headache [Dizziness] : dizziness [Sleep Disturbances] : sleep disturbances [Anxiety] : anxiety [Depression] : depression [Libido Decreased] : libido decreased [Nl] : Musculoskeletal [FreeTextEntry3] : general poor feeling overall [FreeTextEntry2] : vertigo, motor and sensory disturbances [FreeTextEntry1] : rashes, skin lesions

## 2019-06-27 NOTE — HISTORY OF PRESENT ILLNESS
[Last Pap ___] : Last cervical pap smear was [unfilled] [Definite:  ___ (Date)] : the last menstrual period was [unfilled] [Regular Cycle Intervals] : periods have been irregular

## 2019-06-27 NOTE — CHIEF COMPLAINT
[Urgent Visit] : Urgent Visit [FreeTextEntry1] : Irregular periods, vomiting, extreme neck pain with period  GL

## 2019-06-28 LAB
BASOPHILS # BLD AUTO: 0.08 K/UL
BASOPHILS NFR BLD AUTO: 0.8 %
EOSINOPHIL # BLD AUTO: 0.19 K/UL
EOSINOPHIL NFR BLD AUTO: 1.9 %
ESTRADIOL SERPL-MCNC: 246 PG/ML
FSH SERPL-MCNC: 5.8 IU/L
HCT VFR BLD CALC: 42.7 %
HGB BLD-MCNC: 12.9 G/DL
IMM GRANULOCYTES NFR BLD AUTO: 0.3 %
LH SERPL-ACNC: 7.6 IU/L
LYMPHOCYTES # BLD AUTO: 3.62 K/UL
LYMPHOCYTES NFR BLD AUTO: 35.4 %
MAN DIFF?: NORMAL
MCHC RBC-ENTMCNC: 25 PG
MCHC RBC-ENTMCNC: 30.2 GM/DL
MCV RBC AUTO: 82.8 FL
MONOCYTES # BLD AUTO: 0.7 K/UL
MONOCYTES NFR BLD AUTO: 6.8 %
NEUTROPHILS # BLD AUTO: 5.61 K/UL
NEUTROPHILS NFR BLD AUTO: 54.8 %
PLATELET # BLD AUTO: 405 K/UL
PROLACTIN SERPL-MCNC: 13 NG/ML
RBC # BLD: 5.16 M/UL
RBC # FLD: 16.7 %
WBC # FLD AUTO: 10.23 K/UL

## 2019-07-03 ENCOUNTER — APPOINTMENT (OUTPATIENT)
Dept: OBGYN | Facility: CLINIC | Age: 44
End: 2019-07-03

## 2020-08-24 ENCOUNTER — APPOINTMENT (OUTPATIENT)
Dept: OBGYN | Facility: CLINIC | Age: 45
End: 2020-08-24
Payer: COMMERCIAL

## 2020-08-24 ENCOUNTER — EMERGENCY (EMERGENCY)
Facility: HOSPITAL | Age: 45
LOS: 0 days | Discharge: HOME | End: 2020-08-24
Attending: EMERGENCY MEDICINE
Payer: COMMERCIAL

## 2020-08-24 VITALS
DIASTOLIC BLOOD PRESSURE: 66 MMHG | RESPIRATION RATE: 18 BRPM | SYSTOLIC BLOOD PRESSURE: 119 MMHG | HEART RATE: 62 BPM | OXYGEN SATURATION: 98 % | TEMPERATURE: 98 F

## 2020-08-24 VITALS
WEIGHT: 270 LBS | DIASTOLIC BLOOD PRESSURE: 60 MMHG | TEMPERATURE: 97.3 F | HEIGHT: 64 IN | SYSTOLIC BLOOD PRESSURE: 96 MMHG | BODY MASS INDEX: 46.1 KG/M2

## 2020-08-24 VITALS — WEIGHT: 199.96 LBS

## 2020-08-24 DIAGNOSIS — E03.9 ENDOCRINE, NUTRITIONAL AND METABOLIC DISEASES COMPLICATING PREGNANCY, SECOND TRIMESTER: ICD-10-CM

## 2020-08-24 DIAGNOSIS — O99.282 ENDOCRINE, NUTRITIONAL AND METABOLIC DISEASES COMPLICATING PREGNANCY, SECOND TRIMESTER: ICD-10-CM

## 2020-08-24 DIAGNOSIS — R10.84 GENERALIZED ABDOMINAL PAIN: ICD-10-CM

## 2020-08-24 DIAGNOSIS — O09.522 SUPERVISION OF ELDERLY MULTIGRAVIDA, SECOND TRIMESTER: ICD-10-CM

## 2020-08-24 DIAGNOSIS — Z88.8 ALLERGY STATUS TO OTHER DRUGS, MEDICAMENTS AND BIOLOGICAL SUBSTANCES STATUS: ICD-10-CM

## 2020-08-24 DIAGNOSIS — E03.9 ENDOCRINE, NUTRITIONAL AND METABOLIC DISEASES COMPLICATING PREGNANCY, THIRD TRIMESTER: ICD-10-CM

## 2020-08-24 DIAGNOSIS — R07.9 CHEST PAIN, UNSPECIFIED: ICD-10-CM

## 2020-08-24 DIAGNOSIS — Z88.5 ALLERGY STATUS TO NARCOTIC AGENT: ICD-10-CM

## 2020-08-24 DIAGNOSIS — Z12.39 ENCOUNTER FOR OTHER SCREENING FOR MALIGNANT NEOPLASM OF BREAST: ICD-10-CM

## 2020-08-24 DIAGNOSIS — D64.9 ANEMIA, UNSPECIFIED: ICD-10-CM

## 2020-08-24 DIAGNOSIS — E11.9 TYPE 2 DIABETES MELLITUS WITHOUT COMPLICATIONS: ICD-10-CM

## 2020-08-24 DIAGNOSIS — O99.283 ENDOCRINE, NUTRITIONAL AND METABOLIC DISEASES COMPLICATING PREGNANCY, THIRD TRIMESTER: ICD-10-CM

## 2020-08-24 DIAGNOSIS — N64.4 MASTODYNIA: ICD-10-CM

## 2020-08-24 DIAGNOSIS — Z09 ENCOUNTER FOR FOLLOW-UP EXAMINATION AFTER COMPLETED TREATMENT FOR CONDITIONS OTHER THAN MALIGNANT NEOPLASM: ICD-10-CM

## 2020-08-24 DIAGNOSIS — L40.50 ARTHROPATHIC PSORIASIS, UNSPECIFIED: ICD-10-CM

## 2020-08-24 DIAGNOSIS — R73.09 OTHER ABNORMAL GLUCOSE: ICD-10-CM

## 2020-08-24 DIAGNOSIS — R11.2 NAUSEA WITH VOMITING, UNSPECIFIED: ICD-10-CM

## 2020-08-24 DIAGNOSIS — Z01.419 ENCOUNTER FOR GYNECOLOGICAL EXAMINATION (GENERAL) (ROUTINE) W/OUT ABNORMAL FINDINGS: ICD-10-CM

## 2020-08-24 DIAGNOSIS — R10.9 UNSPECIFIED ABDOMINAL PAIN: ICD-10-CM

## 2020-08-24 DIAGNOSIS — F32.9 MAJOR DEPRESSIVE DISORDER, SINGLE EPISODE, UNSPECIFIED: ICD-10-CM

## 2020-08-24 DIAGNOSIS — F41.9 ANXIETY DISORDER, UNSPECIFIED: ICD-10-CM

## 2020-08-24 DIAGNOSIS — O09.523 SUPERVISION OF ELDERLY MULTIGRAVIDA, THIRD TRIMESTER: ICD-10-CM

## 2020-08-24 DIAGNOSIS — O24.414 GESTATIONAL DIABETES MELLITUS IN PREGNANCY, INSULIN CONTROLLED: ICD-10-CM

## 2020-08-24 DIAGNOSIS — Z12.4 ENCOUNTER FOR SCREENING FOR MALIGNANT NEOPLASM OF CERVIX: ICD-10-CM

## 2020-08-24 DIAGNOSIS — M79.7 FIBROMYALGIA: ICD-10-CM

## 2020-08-24 DIAGNOSIS — E06.3 AUTOIMMUNE THYROIDITIS: ICD-10-CM

## 2020-08-24 DIAGNOSIS — Z12.11 ENCOUNTER FOR SCREENING FOR MALIGNANT NEOPLASM OF COLON: ICD-10-CM

## 2020-08-24 DIAGNOSIS — O24.419 GESTATIONAL DIABETES MELLITUS IN PREGNANCY, UNSPECIFIED CONTROL: ICD-10-CM

## 2020-08-24 DIAGNOSIS — M51.9 UNSPECIFIED THORACIC, THORACOLUMBAR AND LUMBOSACRAL INTERVERTEBRAL DISC DISORDER: ICD-10-CM

## 2020-08-24 LAB
ALBUMIN SERPL ELPH-MCNC: 3.1 G/DL — LOW (ref 3.3–5)
ALP SERPL-CCNC: 100 U/L — SIGNIFICANT CHANGE UP (ref 40–120)
ALT FLD-CCNC: 24 U/L — SIGNIFICANT CHANGE UP (ref 12–78)
ANION GAP SERPL CALC-SCNC: 7 MMOL/L — SIGNIFICANT CHANGE UP (ref 5–17)
APPEARANCE UR: ABNORMAL
AST SERPL-CCNC: 28 U/L — SIGNIFICANT CHANGE UP (ref 15–37)
BASOPHILS # BLD AUTO: 0.09 K/UL — SIGNIFICANT CHANGE UP (ref 0–0.2)
BASOPHILS NFR BLD AUTO: 0.7 % — SIGNIFICANT CHANGE UP (ref 0–2)
BILIRUB SERPL-MCNC: 0.3 MG/DL — SIGNIFICANT CHANGE UP (ref 0.2–1.2)
BILIRUB UR-MCNC: ABNORMAL
BUN SERPL-MCNC: 8 MG/DL — SIGNIFICANT CHANGE UP (ref 7–23)
CALCIUM SERPL-MCNC: 8.9 MG/DL — SIGNIFICANT CHANGE UP (ref 8.5–10.1)
CHLORIDE SERPL-SCNC: 107 MMOL/L — SIGNIFICANT CHANGE UP (ref 96–108)
CO2 SERPL-SCNC: 25 MMOL/L — SIGNIFICANT CHANGE UP (ref 22–31)
COLOR SPEC: YELLOW — SIGNIFICANT CHANGE UP
CREAT SERPL-MCNC: 0.68 MG/DL — SIGNIFICANT CHANGE UP (ref 0.5–1.3)
DIFF PNL FLD: NEGATIVE — SIGNIFICANT CHANGE UP
EOSINOPHIL # BLD AUTO: 0.14 K/UL — SIGNIFICANT CHANGE UP (ref 0–0.5)
EOSINOPHIL NFR BLD AUTO: 1.1 % — SIGNIFICANT CHANGE UP (ref 0–6)
GLUCOSE SERPL-MCNC: 113 MG/DL — HIGH (ref 70–99)
GLUCOSE UR QL: NEGATIVE MG/DL — SIGNIFICANT CHANGE UP
HCT VFR BLD CALC: 35.8 % — SIGNIFICANT CHANGE UP (ref 34.5–45)
HEMOCCULT SP1 STL QL: NEGATIVE
HGB BLD-MCNC: 10 G/DL — LOW (ref 11.5–15.5)
IMM GRANULOCYTES NFR BLD AUTO: 0.4 % — SIGNIFICANT CHANGE UP (ref 0–1.5)
KETONES UR-MCNC: ABNORMAL
LACTATE SERPL-SCNC: 2.2 MMOL/L — HIGH (ref 0.7–2)
LEUKOCYTE ESTERASE UR-ACNC: ABNORMAL
LIDOCAIN IGE QN: 47 U/L — LOW (ref 73–393)
LYMPHOCYTES # BLD AUTO: 2.61 K/UL — SIGNIFICANT CHANGE UP (ref 1–3.3)
LYMPHOCYTES # BLD AUTO: 20.3 % — SIGNIFICANT CHANGE UP (ref 13–44)
MCHC RBC-ENTMCNC: 19.8 PG — LOW (ref 27–34)
MCHC RBC-ENTMCNC: 27.9 GM/DL — LOW (ref 32–36)
MCV RBC AUTO: 70.8 FL — LOW (ref 80–100)
MONOCYTES # BLD AUTO: 0.71 K/UL — SIGNIFICANT CHANGE UP (ref 0–0.9)
MONOCYTES NFR BLD AUTO: 5.5 % — SIGNIFICANT CHANGE UP (ref 2–14)
NEUTROPHILS # BLD AUTO: 9.28 K/UL — HIGH (ref 1.8–7.4)
NEUTROPHILS NFR BLD AUTO: 72 % — SIGNIFICANT CHANGE UP (ref 43–77)
NITRITE UR-MCNC: NEGATIVE — SIGNIFICANT CHANGE UP
PH UR: 6.5 — SIGNIFICANT CHANGE UP (ref 5–8)
PLATELET # BLD AUTO: 355 K/UL — SIGNIFICANT CHANGE UP (ref 150–400)
POTASSIUM SERPL-MCNC: 3.6 MMOL/L — SIGNIFICANT CHANGE UP (ref 3.5–5.3)
POTASSIUM SERPL-SCNC: 3.6 MMOL/L — SIGNIFICANT CHANGE UP (ref 3.5–5.3)
PROT SERPL-MCNC: 7.7 GM/DL — SIGNIFICANT CHANGE UP (ref 6–8.3)
PROT UR-MCNC: 15 MG/DL
QUALITY CONTROL: YES
RBC # BLD: 5.06 M/UL — SIGNIFICANT CHANGE UP (ref 3.8–5.2)
RBC # FLD: 23.7 % — HIGH (ref 10.3–14.5)
SODIUM SERPL-SCNC: 139 MMOL/L — SIGNIFICANT CHANGE UP (ref 135–145)
SP GR SPEC: 1.01 — SIGNIFICANT CHANGE UP (ref 1.01–1.02)
UROBILINOGEN FLD QL: 4 MG/DL
WBC # BLD: 12.88 K/UL — HIGH (ref 3.8–10.5)
WBC # FLD AUTO: 12.88 K/UL — HIGH (ref 3.8–10.5)

## 2020-08-24 PROCEDURE — 81001 URINALYSIS AUTO W/SCOPE: CPT

## 2020-08-24 PROCEDURE — 93010 ELECTROCARDIOGRAM REPORT: CPT

## 2020-08-24 PROCEDURE — 71046 X-RAY EXAM CHEST 2 VIEWS: CPT

## 2020-08-24 PROCEDURE — 87086 URINE CULTURE/COLONY COUNT: CPT

## 2020-08-24 PROCEDURE — 85025 COMPLETE CBC W/AUTO DIFF WBC: CPT

## 2020-08-24 PROCEDURE — 81025 URINE PREGNANCY TEST: CPT

## 2020-08-24 PROCEDURE — 96374 THER/PROPH/DIAG INJ IV PUSH: CPT | Mod: XU

## 2020-08-24 PROCEDURE — 93005 ELECTROCARDIOGRAM TRACING: CPT

## 2020-08-24 PROCEDURE — 74177 CT ABD & PELVIS W/CONTRAST: CPT | Mod: 26

## 2020-08-24 PROCEDURE — 74177 CT ABD & PELVIS W/CONTRAST: CPT

## 2020-08-24 PROCEDURE — 96376 TX/PRO/DX INJ SAME DRUG ADON: CPT | Mod: XU

## 2020-08-24 PROCEDURE — 99214 OFFICE O/P EST MOD 30 MIN: CPT | Mod: 25

## 2020-08-24 PROCEDURE — 99284 EMERGENCY DEPT VISIT MOD MDM: CPT | Mod: 25

## 2020-08-24 PROCEDURE — 80053 COMPREHEN METABOLIC PANEL: CPT

## 2020-08-24 PROCEDURE — 96375 TX/PRO/DX INJ NEW DRUG ADDON: CPT | Mod: XU

## 2020-08-24 PROCEDURE — 83605 ASSAY OF LACTIC ACID: CPT

## 2020-08-24 PROCEDURE — 81003 URINALYSIS AUTO W/O SCOPE: CPT | Mod: QW

## 2020-08-24 PROCEDURE — 82270 OCCULT BLOOD FECES: CPT

## 2020-08-24 PROCEDURE — 83690 ASSAY OF LIPASE: CPT

## 2020-08-24 PROCEDURE — 71046 X-RAY EXAM CHEST 2 VIEWS: CPT | Mod: 26

## 2020-08-24 PROCEDURE — 99396 PREV VISIT EST AGE 40-64: CPT

## 2020-08-24 PROCEDURE — 36415 COLL VENOUS BLD VENIPUNCTURE: CPT

## 2020-08-24 PROCEDURE — 99285 EMERGENCY DEPT VISIT HI MDM: CPT

## 2020-08-24 RX ORDER — ONDANSETRON 8 MG/1
4 TABLET, FILM COATED ORAL ONCE
Refills: 0 | Status: COMPLETED | OUTPATIENT
Start: 2020-08-24 | End: 2020-08-24

## 2020-08-24 RX ORDER — DULOXETINE HYDROCHLORIDE 20 MG/1
20 CAPSULE, DELAYED RELEASE ORAL
Refills: 0 | Status: ACTIVE | COMMUNITY

## 2020-08-24 RX ORDER — OXYCODONE 10 MG/1
10 TABLET ORAL
Refills: 0 | Status: ACTIVE | COMMUNITY

## 2020-08-24 RX ORDER — HYDROMORPHONE HYDROCHLORIDE 2 MG/ML
1 INJECTION INTRAMUSCULAR; INTRAVENOUS; SUBCUTANEOUS
Qty: 6 | Refills: 0
Start: 2020-08-24 | End: 2020-08-24

## 2020-08-24 RX ORDER — HYDROMORPHONE HYDROCHLORIDE 2 MG/ML
1 INJECTION INTRAMUSCULAR; INTRAVENOUS; SUBCUTANEOUS ONCE
Refills: 0 | Status: DISCONTINUED | OUTPATIENT
Start: 2020-08-24 | End: 2020-08-24

## 2020-08-24 RX ORDER — DULOXETINE HYDROCHLORIDE 60 MG/1
60 CAPSULE, DELAYED RELEASE ORAL
Refills: 0 | Status: ACTIVE | COMMUNITY

## 2020-08-24 RX ORDER — SODIUM CHLORIDE 9 MG/ML
1000 INJECTION INTRAMUSCULAR; INTRAVENOUS; SUBCUTANEOUS ONCE
Refills: 0 | Status: COMPLETED | OUTPATIENT
Start: 2020-08-24 | End: 2020-08-24

## 2020-08-24 RX ADMIN — ONDANSETRON 4 MILLIGRAM(S): 8 TABLET, FILM COATED ORAL at 12:36

## 2020-08-24 RX ADMIN — SODIUM CHLORIDE 1000 MILLILITER(S): 9 INJECTION INTRAMUSCULAR; INTRAVENOUS; SUBCUTANEOUS at 12:59

## 2020-08-24 RX ADMIN — SODIUM CHLORIDE 2000 MILLILITER(S): 9 INJECTION INTRAMUSCULAR; INTRAVENOUS; SUBCUTANEOUS at 12:36

## 2020-08-24 RX ADMIN — HYDROMORPHONE HYDROCHLORIDE 1 MILLIGRAM(S): 2 INJECTION INTRAMUSCULAR; INTRAVENOUS; SUBCUTANEOUS at 13:46

## 2020-08-24 RX ADMIN — HYDROMORPHONE HYDROCHLORIDE 1 MILLIGRAM(S): 2 INJECTION INTRAMUSCULAR; INTRAVENOUS; SUBCUTANEOUS at 12:36

## 2020-08-24 NOTE — ED ADULT TRIAGE NOTE - WEIGHT IN KG
90.7 Spine appears normal, range of motion is not limited, no muscle or joint tenderness Spine appears normal, range of motion is not limited, no muscle or joint tenderness. Full ROM of LE and UE. +4-5 strength in RLE Spine appears normal, range of motion is not limited, no muscle or joint tenderness. Full ROM of LE and UE. +4-5 strength in RLE.

## 2020-08-24 NOTE — ED STATDOCS - CARE PLAN
Principal Discharge DX:	Generalized abdominal pain  Secondary Diagnosis:	Chest pain, unspecified type  Secondary Diagnosis:	Other iron deficiency anemia

## 2020-08-24 NOTE — REVIEW OF SYSTEMS
[Abdominal Pain] : abdominal pain [Vomiting] : vomiting [Bloating] : bloating [Abdominal Swelling] : abdominal swelling [Pelvic Pain] : pelvic pain [Pain When Defecating] : pain when defecating [Breast Pain] : breast pain [Breast Reddening] : reddening of the breast [Breast Swelling] : breast swelling [Breast Lump] : breast lump [Breast Itching] : breast itching [Nl] : Musculoskeletal

## 2020-08-24 NOTE — ED ADULT TRIAGE NOTE - CHIEF COMPLAINT QUOTE
pt presents to ED due to complaints of abdominal pain pt was seen and sent by PMD had full work up done at [facility]

## 2020-08-24 NOTE — ED ADULT NURSE NOTE - CHIEF COMPLAINT QUOTE
pt presents to ED due to complaints of abdominal pain pt was seen and sent by PMD had full work up done at [facility]

## 2020-08-24 NOTE — ED STATDOCS - PATIENT PORTAL LINK FT
You can access the FollowMyHealth Patient Portal offered by [facility] by registering at the following website: [email or web address]. By joining [facility]’s FollowMyHealth portal, you will also be able to view your health information using other applications (apps) compatible with our system.

## 2020-08-24 NOTE — ED STATDOCS - GASTROINTESTINAL, MLM
abdomen soft, and non-distended. Bowel sounds present. Diffuse TTP all 4 quadrants. No guarding or rebound.

## 2020-08-24 NOTE — ED STATDOCS - PROGRESS NOTE DETAILS
45 y/o Female with Fibromyalgia, Chronic Disk Disease, Hashimoto's disease presented to ED c/o continued abdominal and chest pain that started 8 days ago.  Twisting, pressure pain coming in waves but always present.  Associated with nausea.  Went to [facility] and was admitted from Sunday to Wed.  Had Neg Ct scan and Upper GI and colonoscopy to examine for GI bleed.  No source of bleeding found.  Pt dc home on Iron.  cont Oxycodone for pain.  Went to PMD today and referred here for re-eval.  Vomited x 1 yesterday.  Neg BM since Colonoscopy for 5 days.  Neg fevers, cough, SOB.  Abd: Obese, Active Bs x4, Soft, (+) Tender RUQ and RLQ to palp.  Neg rebound or guarding.  RRR.  Chest: ? Rhonchi to left chest.  Will F/U Labs / Ct.  Bhumi Kaye PA-C On re-eval, pt reports pain changed from 10 / 10 to 7 /10 severity s/p Dilaudid.  Discussed with Dr. Hylton.  Will add 2nd dose of Dilaudid.  Bhumi Kaye PA-C Jodie Mccain for attending Dr. Hylton: Pt updated on results. Plan: d/c with Dilaudid and follow up PCP.

## 2020-08-24 NOTE — ED STATDOCS - ATTENDING CONTRIBUTION TO CARE
I, Lynne Hylton MD, personally saw the patient with ACP.  I have personally performed a face to face diagnostic evaluation on this patient.  I have reviewed the ACP note and agree with the history, exam, and plan of care, except as noted.

## 2020-08-24 NOTE — ED STATDOCS - CLINICAL SUMMARY MEDICAL DECISION MAKING FREE TEXT BOX
Pt with over a week of abd pain s/p admission to [facility], s/p colonoscopy on 8/19 now with increasing abd pain. Plan: labs, CT.

## 2020-08-24 NOTE — ED STATDOCS - OBJECTIVE STATEMENT
45 y/o female with a PMHx of anxiety, depression, fibromyalgia, Hashimoto's disease, psoriatic arthritis, presents to the ED c/o abd pain. Pt reports she developed CP and abd pain a week ago. Pt was seen at [facility], had work up including CT, colonoscopy, upper endoscopy, and was d/c home. Pt still with abd pain, n/v and CP. No recent trauma or injury. LNMP: 7/27/20, notes was over a month last. No recent travel. No known COVID exposure.  No other complaints at this time.

## 2020-08-25 LAB
CULTURE RESULTS: SIGNIFICANT CHANGE UP
SPECIMEN SOURCE: SIGNIFICANT CHANGE UP

## 2020-08-26 LAB — HPV HIGH+LOW RISK DNA PNL CVX: NOT DETECTED

## 2020-08-27 LAB — BACTERIA UR CULT: NORMAL

## 2020-08-27 NOTE — HISTORY OF PRESENT ILLNESS
[Last Pap ___] : Last cervical pap smear was [unfilled] [Definite:  ___ (Date)] : the last menstrual period was [unfilled] [Menstrual Cramps] : menstrual cramps [Menarche Age: ____] : age at menarche was [unfilled] [Sexually Active] : is sexually active [Male ___] : [unfilled] male [de-identified] : Pelvic US 7/23/2015 [Spotting Between  Menses] : no spotting between menses [Contraception] : does not use contraception

## 2020-08-27 NOTE — PHYSICAL EXAM
[Awake] : awake [Alert] : alert [Soft] : soft [Oriented x3] : oriented to person, place, and time [No Lesions] : no genitalia lesions [Labia Minora] : labia minora [Labia Majora] : labia major [No Bleeding] : there was no active vaginal bleeding [Pap Obtained] : a Pap smear was performed [Uterine Adnexae] : were not tender and not enlarged [No Tenderness] : no rectal tenderness [Nl Sphincter Tone] : normal sphincter tone [Examination Of The Breasts] : a normal appearance [Normal] : normal [No Masses] : no breast masses were palpable [No Discharge] : no discharge [Obese] : obese [Diffuse] : there was tenderness diffusely on palpation [None] : no CVA tenderness [Acute Distress] : no acute distress [Mass] : no breast mass [Tender] : non tender [Axillary Lymph Nodes Enlarged Bilaterally] : no enlarged nodes [Nipple Discharge] : no nipple discharge [Distended] : not distended [Combs's] : a negative Combs's sign [Depressed Mood] : not depressed [Rovsing's] : a negative Rovsing's sign [Flat Affect] : affect not flat [Occult Blood] : occult blood test from digital rectal exam was negative

## 2020-08-30 LAB — CYTOLOGY CVX/VAG DOC THIN PREP: ABNORMAL

## 2020-12-23 PROBLEM — Z01.419 ENCOUNTER FOR ANNUAL ROUTINE GYNECOLOGICAL EXAMINATION: Status: RESOLVED | Noted: 2020-08-24 | Resolved: 2020-12-23

## 2022-03-23 ENCOUNTER — APPOINTMENT (OUTPATIENT)
Dept: DERMATOLOGY | Facility: CLINIC | Age: 47
End: 2022-03-23
Payer: COMMERCIAL

## 2022-03-23 DIAGNOSIS — L98.1 FACTITIAL DERMATITIS: ICD-10-CM

## 2022-03-23 DIAGNOSIS — L68.0 HIRSUTISM: ICD-10-CM

## 2022-03-23 DIAGNOSIS — Z91.89 OTHER SPECIFIED PERSONAL RISK FACTORS, NOT ELSEWHERE CLASSIFIED: ICD-10-CM

## 2022-03-23 PROCEDURE — 99203 OFFICE O/P NEW LOW 30 MIN: CPT

## 2022-03-23 RX ORDER — SERTRALINE HYDROCHLORIDE 25 MG/1
TABLET, FILM COATED ORAL
Refills: 0 | Status: DISCONTINUED | COMMUNITY
End: 2022-03-23

## 2022-03-23 RX ORDER — SPIRONOLACTONE 50 MG/1
50 TABLET ORAL TWICE DAILY
Qty: 60 | Refills: 1 | Status: ACTIVE | COMMUNITY
Start: 2022-03-23 | End: 1900-01-01

## 2022-03-23 RX ORDER — ESCITALOPRAM OXALATE 5 MG/1
5 TABLET, FILM COATED ORAL
Refills: 0 | Status: ACTIVE | COMMUNITY

## 2022-03-23 NOTE — HISTORY OF PRESENT ILLNESS
[FreeTextEntry1] : Acne [de-identified] : First visit for 46-year-old white female with a 5 month history of "pus filled pimples on the face and excess of hairs on the chin and anterior neck.  Patient "pops" the pimples and complains of a "foul smell" coming from them.  No previous treatment.\par \par Note: Patient previously worked as an .

## 2022-03-23 NOTE — PHYSICAL EXAM
[Alert] : alert [Oriented x 3] : ~L oriented x 3 [Well Nourished] : well nourished [FreeTextEntry3] : Type II skin\par \par Few crusted excoriations present on the left cheek and submental area\par Multiple short white vellus hairs present on the cheeks and anterior neck

## 2022-03-29 ENCOUNTER — NON-APPOINTMENT (OUTPATIENT)
Age: 47
End: 2022-03-29

## 2022-03-29 RX ORDER — FLUTICASONE PROPIONATE 0.05 MG/G
0.01 OINTMENT TOPICAL
Qty: 1 | Refills: 1 | Status: ACTIVE | COMMUNITY
Start: 2022-03-23 | End: 1900-01-01

## 2022-04-20 ENCOUNTER — APPOINTMENT (OUTPATIENT)
Dept: DERMATOLOGY | Facility: CLINIC | Age: 47
End: 2022-04-20

## 2022-10-19 ENCOUNTER — APPOINTMENT (OUTPATIENT)
Dept: ENDOCRINOLOGY | Facility: CLINIC | Age: 47
End: 2022-10-19

## 2024-09-23 NOTE — ED STATDOCS - CROS ED CONS ALL NEG
Left message to call back for: Patient  Information to relay to patient: See provider message below and assist patient with scheduling annual physical prior to next refill   
Patient is overdue for appointment including annual physical. 90 day refill sent to pharmacy. Will need appointment prior to next refill. Please help schedule.  
Spoke with patient - annual exam scheduled with [facility] 10/11/2024  
- - -
